# Patient Record
Sex: FEMALE | Race: WHITE | URBAN - METROPOLITAN AREA
[De-identification: names, ages, dates, MRNs, and addresses within clinical notes are randomized per-mention and may not be internally consistent; named-entity substitution may affect disease eponyms.]

---

## 2022-08-18 ENCOUNTER — APPOINTMENT (RX ONLY)
Dept: URBAN - METROPOLITAN AREA CLINIC 114 | Facility: CLINIC | Age: 82
Setting detail: DERMATOLOGY
End: 2022-08-18

## 2022-08-18 DIAGNOSIS — H02.72 MADAROSIS OF EYELID AND PERIOCULAR AREA: ICD-10-CM

## 2022-08-18 DIAGNOSIS — L98.8 OTHER SPECIFIED DISORDERS OF THE SKIN AND SUBCUTANEOUS TISSUE: ICD-10-CM

## 2022-08-18 DIAGNOSIS — R20.8 OTHER DISTURBANCES OF SKIN SENSATION: ICD-10-CM

## 2022-08-18 DIAGNOSIS — L90.5 SCAR CONDITIONS AND FIBROSIS OF SKIN: ICD-10-CM

## 2022-08-18 DIAGNOSIS — R23.4 CHANGES IN SKIN TEXTURE: ICD-10-CM

## 2022-08-18 PROBLEM — H02.721 MADAROSIS OF RIGHT UPPER EYELID AND PERIOCULAR AREA: Status: ACTIVE | Noted: 2022-08-18

## 2022-08-18 PROCEDURE — ? PRESCRIPTION

## 2022-08-18 PROCEDURE — ? COUNSELING

## 2022-08-18 PROCEDURE — ? ADDITIONAL NOTES

## 2022-08-18 PROCEDURE — 99203 OFFICE O/P NEW LOW 30 MIN: CPT

## 2022-08-18 PROCEDURE — ? DEFER

## 2022-08-18 RX ORDER — BIMATOPROST 0.3 MG/ML
1 SOLUTION/ DROPS OPHTHALMIC
Qty: 3 | Refills: 1 | COMMUNITY
Start: 2022-08-18

## 2022-08-18 RX ADMIN — BIMATOPROST 1: 0.3 SOLUTION/ DROPS OPHTHALMIC at 00:00

## 2022-08-18 ASSESSMENT — LOCATION SIMPLE DESCRIPTION DERM
LOCATION SIMPLE: RIGHT SUPERIOR EYELID
LOCATION SIMPLE: UPPER LIP
LOCATION SIMPLE: LEFT BREAST
LOCATION SIMPLE: RIGHT BREAST

## 2022-08-18 ASSESSMENT — LOCATION DETAILED DESCRIPTION DERM
LOCATION DETAILED: LEFT MEDIAL BREAST 6-7:00 REGION
LOCATION DETAILED: RIGHT MEDIAL SUPERIOR EYELID
LOCATION DETAILED: RIGHT PERIAREOLAR BREAST 6-7:00 REGION
LOCATION DETAILED: RIGHT LATERAL BREAST 6-7:00 REGION
LOCATION DETAILED: PHILTRUM
LOCATION DETAILED: RIGHT MEDIAL BREAST 4-5:00 REGION

## 2022-08-18 ASSESSMENT — LOCATION ZONE DERM
LOCATION ZONE: TRUNK
LOCATION ZONE: EYELID
LOCATION ZONE: LIP

## 2022-08-18 NOTE — PROCEDURE: DEFER
X Size Of Lesion In Cm (Optional): 0
Detail Level: Detailed
Introduction Text (Please End With A Colon): Jourdan Berg
Instructions (Optional): Refer to plastic surgeon

## 2022-08-18 NOTE — PROCEDURE: ADDITIONAL NOTES
Additional Notes: Recommended lasers or fillers
Render Risk Assessment In Note?: no
Detail Level: Detailed